# Patient Record
Sex: MALE | Race: BLACK OR AFRICAN AMERICAN | Employment: FULL TIME | URBAN - METROPOLITAN AREA
[De-identification: names, ages, dates, MRNs, and addresses within clinical notes are randomized per-mention and may not be internally consistent; named-entity substitution may affect disease eponyms.]

---

## 2019-10-07 ENCOUNTER — HOSPITAL ENCOUNTER (OUTPATIENT)
Dept: GENERAL RADIOLOGY | Age: 34
Discharge: HOME | End: 2019-10-07
Payer: COMMERCIAL

## 2019-10-07 DIAGNOSIS — M79.671 PAIN OF RIGHT HEEL: ICD-10-CM

## 2019-10-07 PROBLEM — E66.01 OBESITY, MORBID (HCC): Status: ACTIVE | Noted: 2019-10-07

## 2019-10-07 PROCEDURE — 73630 X-RAY EXAM OF FOOT: CPT

## 2019-10-07 NOTE — PROGRESS NOTES
His x-ray of his heel is okay and I suspect that he has a chronic contusion type effect.   We will discuss treatment at his next visit

## 2019-10-10 ENCOUNTER — HOSPITAL ENCOUNTER (OUTPATIENT)
Dept: GENERAL RADIOLOGY | Age: 34
Discharge: HOME | End: 2019-10-10
Attending: INTERNAL MEDICINE
Payer: COMMERCIAL

## 2019-10-10 DIAGNOSIS — R05.3 CHRONIC COUGH: ICD-10-CM

## 2019-10-10 PROCEDURE — 71046 X-RAY EXAM CHEST 2 VIEWS: CPT

## 2020-01-13 ENCOUNTER — HOSPITAL ENCOUNTER (OUTPATIENT)
Dept: GENERAL RADIOLOGY | Age: 35
Discharge: HOME | End: 2020-01-13
Attending: SURGERY
Payer: COMMERCIAL

## 2020-01-13 DIAGNOSIS — K21.9 GASTROESOPHAGEAL REFLUX DISEASE WITHOUT ESOPHAGITIS: ICD-10-CM

## 2020-01-13 PROCEDURE — 74011000255 HC RX REV CODE- 255: Performed by: SURGERY

## 2020-01-13 PROCEDURE — 74246 X-RAY XM UPR GI TRC 2CNTRST: CPT

## 2020-01-13 PROCEDURE — 74011000250 HC RX REV CODE- 250: Performed by: SURGERY

## 2020-01-13 RX ADMIN — BARIUM SULFATE 135 ML: 980 POWDER, FOR SUSPENSION ORAL at 09:32

## 2020-01-13 RX ADMIN — ANTACID/ANTIFLATULENT 4 G: 380; 550; 10; 10 GRANULE, EFFERVESCENT ORAL at 09:32

## 2020-02-21 ENCOUNTER — HOSPITAL ENCOUNTER (OUTPATIENT)
Dept: SLEEP MEDICINE | Age: 35
Discharge: HOME | End: 2020-02-21
Payer: COMMERCIAL

## 2020-02-21 PROCEDURE — 95811 POLYSOM 6/>YRS CPAP 4/> PARM: CPT

## 2020-03-02 ENCOUNTER — TELEPHONE (OUTPATIENT)
Dept: NUTRITION | Age: 35
End: 2020-03-02

## 2020-03-02 NOTE — TELEPHONE ENCOUNTER
Nutrition Counseling: Contacted pt regarding referral. See notes documented in Nutrition Counseling Referral for details. No further follow-up contact from pt. Will close referral for this office.     [address] [facility]  [phone]

## 2020-08-03 PROBLEM — G47.33 OSA TREATED WITH BIPAP: Status: ACTIVE | Noted: 2020-08-03

## 2020-08-03 PROBLEM — G47.34 NOCTURNAL HYPOXEMIA: Status: ACTIVE | Noted: 2020-08-03

## 2021-08-30 PROBLEM — I10 ESSENTIAL HYPERTENSION: Status: ACTIVE | Noted: 2021-08-30

## 2021-08-30 PROBLEM — E78.2 MIXED HYPERLIPIDEMIA: Status: ACTIVE | Noted: 2021-08-30

## 2021-10-26 ENCOUNTER — HOSPITAL ENCOUNTER (OUTPATIENT)
Dept: PHYSICAL THERAPY | Age: 36
Discharge: HOME | End: 2021-10-26
Attending: SURGERY
Payer: COMMERCIAL

## 2021-10-26 DIAGNOSIS — E66.01 MORBID OBESITY (HCC): ICD-10-CM

## 2021-10-26 PROCEDURE — 97161 PT EVAL LOW COMPLEX 20 MIN: CPT

## 2021-10-26 NOTE — THERAPY EVALUATION
Merary Potter  : 1985  Primary: [facility]*  Secondary:  [facility] Center at [facility] [address], [address], [place] [address].  [phone]   [phone]       OUTPATIENT PHYSICAL THERAPY:Initial Assessment and Discharge Summary 10/26/2021     ICD-10: Treatment Diagnosis: muscle weakness M62.81   Precautions/Allergies:  Pollen extracts   TREATMENT PLAN:  Effective Dates: 10/26/2021 TO Today (10/26/2021). Frequency/Duration: 1 visit for HEP MEDICAL/REFERRING DIAGNOSIS:  Morbid obesity ([facility] [address] [E66.01]   DATE OF ONSET: chronic  REFERRING PHYSICIAN: Truong Ricci MD MD Orders: Referral to physical therapy  Return MD Appointment: TBD     INITIAL ASSESSMENT:  Mr. Kenyatta Morris presents to physical therapy in preparation for bariatric surgery. He is planning to start power lifting in his garage and also has a treadmill for cardio exercise. He has exercised in the past with success and appears to have good knowledge of what kind of exercises to do and how to progress exercises. No further PT needs at this time. PROBLEM LIST (Impacting functional limitations):  1. Decreased Strength INTERVENTIONS PLANNED: (Treatment may consist of any combination of the following)  1. Therapeutic Exercise/Strengthening     GOALS: (Goals have been discussed and agreed upon with patient.)  Discharge Goals: Time Frame: 1 visit  1. Pt will be independent with exercise program.     OUTCOME MEASURE:   Tool Used: Lower Extremity Functional Scale (LEFS)  Score:  Initial: 73/80 Most Recent:  (Date: -- )   Interpretation of Score: 20 questions each scored on a 5 point scale with 0 representing \"extreme difficulty or unable to perform\" and 4 representing \"no difficulty\". The lower the score, the greater the functional disability. 80/80 represents no disability. Minimal detectable change is 9 points.     Total Duration: 30 minutes  PT Patient Time In/Time Out  Time In: 1030  Time Out: 1100    Rehabilitation Potential For Stated Goals: Good  Regarding Mary South's therapy, I certify that the treatment plan above will be carried out by a therapist or under their direction. Thank you for this referral,    Yudith Hendricks, PT      Referring Physician Signature: Bhargavi Ceron MD No Signature is Required for this note. PAIN/SUBJECTIVE:   Initial:   0/10 Post Session:  0/10   HISTORY:   History of Injury/Illness (Reason for Referral): Pt presents to physical therapy in preparation for bariatric surgery. He has tried different work out programs in the past and is able to lose weight and then the weight returns. He has a gym in his garage with weights and a treadmill. Past Medical History/Comorbidities:   Mr. Jerardo Turner  has a past medical history of ADHD (attention deficit hyperactivity disorder), Allergic rhinitis, Anxiety, Asthma, Depression, Joint pain, and Morbid obesity ([facility] [address] Mr. Jerardo Turner  has a past surgical history that includes hx hernia repair. Social History/Living Environment:     Lives with wife and 2 small children  Prior Level of Function/Work/Activity:   for [employer]   Ambulatory/Rehab Services H2 Model Falls Risk Assessment   Risk Factors:       (1)  Gender [Male] Ability to Rise from Chair:       (0)  Ability to rise in a single movement   Falls Prevention Plan:       No modifications necessary   Total: (5 or greater = High Risk): 1   ©2010 [facility] [address] [place] [address] Patent #[id number].  Federal Law prohibits the replication, distribution or use without written permission from [facility] [employer]   Current Medications:       Current Outpatient Medications:     rosuvastatin (CRESTOR) 20 mg tablet, Take 1 Tablet by mouth nightly., Disp: 90 Tablet, Rfl: 2    albuterol (PROVENTIL HFA, VENTOLIN HFA, PROAIR HFA) 90 mcg/actuation inhaler, Take 2 Puffs by inhalation every four (4) hours as needed for Wheezing. Indications: asthma attack, Disp: 1 Inhaler, Rfl: 3    triamterene-hydroCHLOROthiazide (MAXZIDE) 37.5-25 mg per tablet, Take 1 Tablet by mouth daily. , Disp: 90 Tablet, Rfl: 1    fluticasone propionate (FLONASE) 50 mcg/actuation nasal spray, 1 [place] by Both Nostrils route daily. , Disp: 3 Bottle, Rfl: 3    acetaminophen (TYLENOL EXTRA STRENGTH) 500 mg tablet, Take  by mouth every six (6) hours as needed for Pain., Disp: , Rfl:     fexofenadine-pseudoephedrine (ALLEGRA-D 12 HOUR)  mg Tb12, Take 1 Tab by mouth every twelve (12) hours. , Disp: , Rfl:    Date Last Reviewed:  10-27-21   Number of Personal Factors/Comorbidities that affect the Plan of Care: 0: LOW COMPLEXITY   EXAMINATION:     Observation/Orthostatic Postural Assessment: Pt presents to physical therapy in no apparent distress   Palpation: n/t  ROM: knee and shoulder ROM WFL  Strength: grossly 4+/5  Functional Mobility:  Sit to/from Stand: independent. Bed Mobility: independent. Walking on level ground: ambulates with wide base of support and decreased speed. Balance: n/t; no loss of balance with gait     Body Structures Involved:  1. Joints  2. Muscles Body Functions Affected:  1. Neuromusculoskeletal Activities and Participation Affected:  1.  Community, Social and [place]   Number of elements (examined above) that affect the Plan of Care: 1-2: LOW COMPLEXITY   CLINICAL PRESENTATION:   Presentation: Stable and uncomplicated: LOW COMPLEXITY   CLINICAL DECISION MAKING:   Use of outcome tool(s) and clinical judgement create a POC that gives a: Clear prediction of patient's progress: LOW COMPLEXITY

## 2022-02-23 ENCOUNTER — HOSPITAL ENCOUNTER (OUTPATIENT)
Dept: LAB | Age: 37
Discharge: HOME | End: 2022-02-23
Attending: PHYSICIAN ASSISTANT
Payer: COMMERCIAL

## 2022-02-23 DIAGNOSIS — E66.01 MORBID OBESITY (HCC): ICD-10-CM

## 2022-02-23 DIAGNOSIS — Z01.812 ENCOUNTER FOR PRE-OPERATIVE LABORATORY TESTING: ICD-10-CM

## 2022-02-23 LAB
25(OH)D3 SERPL-MCNC: 13.3 NG/ML (ref 30–100)
EST. AVERAGE GLUCOSE BLD GHB EST-MCNC: 117 MG/DL
HBA1C MFR BLD: 5.7 % (ref 4.2–6.3)
TSH SERPL DL<=0.005 MIU/L-ACNC: 1.25 UIU/ML

## 2022-02-23 PROCEDURE — 82306 VITAMIN D 25 HYDROXY: CPT

## 2022-02-23 PROCEDURE — 84443 ASSAY THYROID STIM HORMONE: CPT

## 2022-02-23 PROCEDURE — 83036 HEMOGLOBIN GLYCOSYLATED A1C: CPT

## 2022-02-23 PROCEDURE — 83013 H PYLORI (C-13) BREATH: CPT

## 2022-02-23 PROCEDURE — 36415 COLL VENOUS BLD VENIPUNCTURE: CPT

## 2022-02-23 PROCEDURE — 80323 ALKALOIDS NOS: CPT

## 2022-02-28 LAB
COTININE SERPL-MCNC: <1 NG/ML
NICOTINE SERPL-MCNC: <1 NG/ML